# Patient Record
(demographics unavailable — no encounter records)

---

## 2025-03-11 NOTE — CONSULT LETTER
[Dear  ___] : Dear ~MECHE, [Consult Letter:] : I had the pleasure of evaluating your patient, [unfilled]. [Please see my note below.] : Please see my note below. [Consult Closing:] : Thank you very much for allowing me to participate in the care of this patient.  If you have any questions, please do not hesitate to contact me. [Sincerely,] : Sincerely, [FreeTextEntry2] : RAMOS Aldana [FreeTextEntry3] : Gigi Ritter MD, FACS System Director, Endocrine Surgery [facility] Associate  Professor of Surgery [employer] [facility]

## 2025-03-11 NOTE — HISTORY OF PRESENT ILLNESS
[de-identified] : Pt c/o Lesion under tongue and neck mass since December.  notes occasional bleeding, dry mouth and decreased tongue mobility.  history of 1 PPD tobacco use for 20 years. denies dysphagia, hoarseness, SOB or RT exposure. I have reviewed all old and new data and available images.

## 2025-03-11 NOTE — PROCEDURE
[FreeTextEntry1] : FOM biopsy [FreeTextEntry2] : FOM lesion concerning for SCCa [FreeTextEntry3] : After patient gave consent, the area of concern was anesthetized with 1% Lidocaine with 1:100K epinephrine.  A biopsy was performed and sent to pathology for further evaluation.  Hemostasis was obtained with silver nitrate.  The patient tolerated the procedure well. [Gag Reflex] : gag reflex preventing mirror examination [None] : none [Flexible Endoscope] : examined with the flexible endoscope [Serial Number: ___] : Serial Number: [unfilled] [de-identified] : After patient consents, a FFL is performed.  No lesions in the NPx, OPx, HPx or larynx.  VC are mobile, airway patent.

## 2025-03-11 NOTE — PHYSICAL EXAM
[de-identified] : no palpable thyroid nodules [Laryngoscopy Performed] : laryngoscopy was performed, see procedure section for findings [Midline] : located in midline position [de-identified] : 3.2 cm deeply infiltrative, ulcerated anterior FOM tumor with impaired tongue protrusion [Normal] : orientation to person, place, and time: normal [de-identified] : swelling left submandibular gland [de-identified] : 2.5 cm left submandibular node [de-identified] : indirect laryngoscopy shows normal vocal cord mobility bilaterally with no lesions noted

## 2025-03-11 NOTE — PHYSICAL EXAM
[Laryngoscopy Performed] : laryngoscopy was performed, see procedure section for findings [FreeTextEntry1] : Ulcerated lesion along the anterior FOM involving the ventral tongue, without involvement of the gingiva.  Tongue is mobile. [Normal] : no rashes

## 2025-03-11 NOTE — REASON FOR VISIT
[Initial Consultation] : an initial consultation for [FreeTextEntry2] : referred by Dr. Gigi Ritter for lesion under tongue and left sided neck mass

## 2025-03-11 NOTE — ASSESSMENT
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 indicated that I do not treat this condition and arranged for patient to be seen by dr bourgeois this AM. patient has been given the opportunity to ask questions, and all of the patient's questions have been answered to their satisfaction.  biopsy deferred to allow dr bourgeois to see lesion in its entirety.

## 2025-03-11 NOTE — HISTORY OF PRESENT ILLNESS
[de-identified] : 42 year old male referred by Dr. Gigi Ritter for lesion under tongue and left sided neck mass notice in December 2024.  States went to hospital 1/2025 for severe pain under his tongue when he was eating. Reports intermittent bleeding.  States mobility of tongue decreased. Reports discomfort when swallowing. Denies dysphagia, odynophagia, aspirations. Denies biopsy. Current smoker 1 pack daily for 20-30 years and marijuana use. Denies alcohol use.   CT neck 1/2025 ([facility]) Impression:1. Left submandibular space lesion most consistent with an enlarged lymph node ; recommend follow-up evaluation to confirm 2. Unremarkable appearance of the upper digestive tract. No focal abnormality of the major salivary glands. Recommend correlation with direct visualization with regard to the clinical indication of fistula

## 2025-04-01 NOTE — DATA REVIEWED
[de-identified] : PET/CT and CT Neck independently interpreted - mass along the anterior FOM, no bone erosion, avid LAD in the left neck.

## 2025-04-01 NOTE — PROCEDURE
[Gag Reflex] : gag reflex preventing mirror examination [None] : none [Flexible Endoscope] : examined with the flexible endoscope [Serial Number: ___] : Serial Number: [unfilled] [FreeTextEntry1] : FOM biopsy [FreeTextEntry2] : FOM lesion concerning for SCCa [FreeTextEntry3] : After patient gave consent, the area of concern was anesthetized with 1% Lidocaine with 1:100K epinephrine.  A biopsy was performed and sent to pathology for further evaluation.  Hemostasis was obtained with silver nitrate.  The patient tolerated the procedure well. [de-identified] : After patient consents, a FFL is performed.  No lesions in the NPx, OPx, HPx or larynx.  VC are mobile, airway patent.

## 2025-04-01 NOTE — DATA REVIEWED
[de-identified] : PET/CT and CT Neck independently interpreted - mass along the anterior FOM, no bone erosion, avid LAD in the left neck.

## 2025-04-01 NOTE — HISTORY OF PRESENT ILLNESS
[de-identified] : 42 year old male referred by Dr. Gigi Ritter for lesion under tongue and left sided neck mass notice in December 2024.  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 Ct of neck and pet ct on 3/27/2025, pt is here to discuss treatment plan.  mild rarely bleeding, mild pain. Pt c.o left submandibular gland swelling on and off worsen after meal for the past few weeks.      Current smoker 1 pack daily for 20-30 years and marijuana use. Denies alcohol use.

## 2025-04-01 NOTE — PROCEDURE
[Gag Reflex] : gag reflex preventing mirror examination [None] : none [Flexible Endoscope] : examined with the flexible endoscope [Serial Number: ___] : Serial Number: [unfilled] [FreeTextEntry1] : FOM biopsy [FreeTextEntry2] : FOM lesion concerning for SCCa [FreeTextEntry3] : After patient gave consent, the area of concern was anesthetized with 1% Lidocaine with 1:100K epinephrine.  A biopsy was performed and sent to pathology for further evaluation.  Hemostasis was obtained with silver nitrate.  The patient tolerated the procedure well. [de-identified] : After patient consents, a FFL is performed.  No lesions in the NPx, OPx, HPx or larynx.  VC are mobile, airway patent.

## 2025-04-01 NOTE — HISTORY OF PRESENT ILLNESS
[de-identified] : 42 year old male referred by Dr. Gigi Ritter for lesion under tongue and left sided neck mass notice in December 2024.  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 Ct of neck and pet ct on 3/27/2025, pt is here to discuss treatment plan.  mild rarely bleeding, mild pain. Pt c.o left submandibular gland swelling on and off worsen after meal for the past few weeks.      Current smoker 1 pack daily for 20-30 years and marijuana use. Denies alcohol use.

## 2025-04-01 NOTE — PHYSICAL EXAM
[Normal] : no rashes [Laryngoscopy Performed] : laryngoscopy was performed, see procedure section for findings [FreeTextEntry1] : Ulcerated lesion along the anterior FOM involving the ventral tongue, without involvement of the gingiva.  Tongue is mobile.

## 2025-04-09 NOTE — PHYSICAL EXAM
[de-identified] : Ulcerated lesion along the anterior FOM involving the ventral tongue, without involvement of the gingiva. Tongue is mobile. [de-identified] : Edson test positive

## 2025-04-09 NOTE — HISTORY OF PRESENT ILLNESS
[FreeTextEntry1] :  Pt is a 42 year old male referred by Dr. Spencer for lesion under tongue and left sided neck mass notice in December 2024. States went to hospital 1/2025 for severe pain under his tongue when he was eating. Reports intermittent bleeding. States mobility of tongue decreased. Reports discomfort when swallowing. Denies dysphagia, odynophagia, aspirations. Denies biopsy. Current smoker 1 pack daily for 20-30 years and marijuana use. Denies alcohol use.  CT neck 1/2025 ([facility]) Impression:1. Left submandibular space lesion most consistent with an enlarged lymph node ; recommend follow-up evaluation to confirm 2. Unremarkable appearance of the upper digestive tract. No focal abnormality of the major salivary glands. Recommend correlation with direct visualization with regard to the clinical indication of fistula

## 2025-05-08 NOTE — DATA REVIEWED
[de-identified] : PET/CT and CT Neck independently interpreted - mass along the anterior FOM, no bone erosion, avid LAD in the left neck.

## 2025-05-08 NOTE — DATA REVIEWED
[de-identified] : PET/CT and CT Neck independently interpreted - mass along the anterior FOM, no bone erosion, avid LAD in the left neck.

## 2025-05-13 NOTE — HISTORY OF PRESENT ILLNESS
[de-identified] : 42 year old male referred by Dr. 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 Ct of neck and pet ct on 3/27/2025, PT is post s/p marginal mandibulectomy, bilateral neck dissection 1-3, tracheostomy placement, left radial forearm free flap, skin graft 4/25/25, and path Squamous cell carcinoma, well to moderately differentiated,Perineural invasion and lymphovascular invasion seen , Left bone margin focally positive for squamous cell carcinoma and 1 lymph node +. pt is here to f/u and doing ok, some pain neck area and some swelling at the chin,  trach site healing and left arm healing well with sutures intact.  pt is on soft diet and puree food.  Current smoker 1 pack daily for 20-30 years and marijuana use. Denies alcohol use.

## 2025-05-13 NOTE — PHYSICAL EXAM
[Normal] : no rashes [Laryngoscopy Performed] : laryngoscopy was performed, see procedure section for findings [de-identified] : Incision healing well, no LAD.  Trach site is healing well. [FreeTextEntry1] : Flap is healing well, no dehiscence, no new lesions.

## 2025-05-13 NOTE — PROCEDURE
[Gag Reflex] : gag reflex preventing mirror examination [None] : none [Flexible Endoscope] : examined with the flexible endoscope [Serial Number: ___] : Serial Number: [unfilled] [de-identified] : After patient consents, a FFL is performed.  No lesions in the NPx, OPx, HPx or larynx.  VC are mobile, airway patent.

## 2025-05-13 NOTE — HISTORY OF PRESENT ILLNESS
[de-identified] : 42 year old male referred by Dr. 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 Ct of neck and pet ct on 3/27/2025, PT is post s/p marginal mandibulectomy, bilateral neck dissection 1-3, tracheostomy placement, left radial forearm free flap, skin graft 4/25/25, and path Squamous cell carcinoma, well to moderately differentiated,Perineural invasion and lymphovascular invasion seen , Left bone margin focally positive for squamous cell carcinoma and 1 lymph node +. pt is here to f/u and doing ok, some pain neck area and some swelling at the chin,  trach site healing and left arm healing well with sutures intact.  pt is on soft diet and puree food.  Current smoker 1 pack daily for 20-30 years and marijuana use. Denies alcohol use.

## 2025-05-13 NOTE — PHYSICAL EXAM
[Normal] : no rashes [Laryngoscopy Performed] : laryngoscopy was performed, see procedure section for findings [de-identified] : Incision healing well, no LAD.  Trach site is healing well. [FreeTextEntry1] : Flap is healing well, no dehiscence, no new lesions.

## 2025-05-13 NOTE — PROCEDURE
[Gag Reflex] : gag reflex preventing mirror examination [None] : none [Flexible Endoscope] : examined with the flexible endoscope [Serial Number: ___] : Serial Number: [unfilled] [de-identified] : After patient consents, a FFL is performed.  No lesions in the NPx, OPx, HPx or larynx.  VC are mobile, airway patent.

## 2025-05-21 NOTE — PHYSICAL EXAM
[General Appearance - Alert] : alert [General Appearance - In No Acute Distress] : in no acute distress [Respiration, Rhythm And Depth] : normal respiratory rhythm and effort [Exaggerated Use Of Accessory Muscles For Inspiration] : no accessory muscle use [Heart Rate And Rhythm] : heart rate and rhythm were normal [Arterial Pulses Normal] : the arterial pulses were normal [Abdomen Soft] : soft [Nondistended] : nondistended [Musculoskeletal - Swelling] : no joint swelling [Nail Clubbing] : no clubbing  or cyanosis of the fingernails [Skin Color & Pigmentation] : normal skin color and pigmentation [No Focal Deficits] : no focal deficits [Oriented To Time, Place, And Person] : oriented to person, place, and time [Sclera] : the sclera and conjunctiva were normal [Extraocular Movements] : extraocular movements were intact [Normal] : normal spine exam without palpable tenderness, no kyphosis or scoliosis [] : no rash [Sensation] : the sensory exam was normal to light touch and pinprick [Affect] : the affect was normal [de-identified] : Well healed neck incision, C/D/I; flap well-vascularlized and healing well; no suspicoius lesions, mild trismus [de-identified] : submental edema

## 2025-05-21 NOTE — HISTORY OF PRESENT ILLNESS
[FreeTextEntry1] :  Mr. Hawkins is a 42 year old male approx. 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   3/27/2025 CT neck Compared to the prior CT from January 26, 2025, enhancing lesion seen in the floor of mouth appears relatively stable in size. MRI would allow for better delineation of the extent of this lesion and depth of invasion into the tongue. Increase in size and complexity of left level 1B metastatic lymph node. New or increasing abnormality of the left level IIb lymph node, which now appears metastatic, corresponding to findings in the same day PET/CT.  3/27/2025 PET/CT Intense hypermetabolism involving the tip of the tongue with extension to the anterior left tongue consistent with known squamous cell carcinoma. Level 1B and level 2A lymph nodes are intensely hypermetabolic consistent with metastasis. Otherwise no abnormal focal FDG activity elsewhere.  PT is post s/p marginal mandibulectomy, bilateral neck dissection 1-3, tracheostomy placement, left radial forearm free flap, skin graft 4/25/25, and path Squamous cell carcinoma, well to moderately differentiated, Perineural invasion and lymphovascular invasion seen , Left bone margin focally positive for squamous cell carcinoma and 1 lymph node +.  Grade 1 G2, tumor size 3.2 cm.  Depth of invasion 16 mm.  There was bilateral involvement of neck nodes, largest size 4.7 cm with SUHA  5/21/2025 he presents today accompanied by spouse. he reports recovering well postoperatively, on soft diets. Denies any pain or difficulty with swallowing. No Dysgeusia, there is some residual neck swelling that is improving post operatively. he quit smoking about 1 month ago and quit alcohol  7 years ago

## 2025-05-21 NOTE — END OF VISIT
[] : Resident [FreeTextEntry3] : We had an extensive discussion of the patient's imaging, labs, and pathology, and reviewed them together; I independently evaluated these and discussed their significance with the patient and family. I have also been involved in the multidisciplinary discussion of his care with his other providers. We discussed the natural history of oral cavity cancer and its management. [Time Spent: ___ minutes] : I have spent [unfilled] minutes of time on the encounter which excludes teaching and separately reported services.

## 2025-05-21 NOTE — REASON FOR VISIT
[Consideration of Curative Therapy] : consideration of curative therapy for [Breast Cancer] : breast cancer [Head and Neck Cancer] : head and neck cancer

## 2025-05-21 NOTE — HISTORY OF PRESENT ILLNESS
[FreeTextEntry1] :  Mr. Hawkins is a 42 year old male approx. 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   3/27/2025 CT neck Compared to the prior CT from January 26, 2025, enhancing lesion seen in the floor of mouth appears relatively stable in size. MRI would allow for better delineation of the extent of this lesion and depth of invasion into the tongue. Increase in size and complexity of left level 1B metastatic lymph node. New or increasing abnormality of the left level IIb lymph node, which now appears metastatic, corresponding to findings in the same day PET/CT.  3/27/2025 PET/CT Intense hypermetabolism involving the tip of the tongue with extension to the anterior left tongue consistent with known squamous cell carcinoma. Level 1B and level 2A lymph nodes are intensely hypermetabolic consistent with metastasis. Otherwise no abnormal focal FDG activity elsewhere.  PT is post s/p marginal mandibulectomy, bilateral neck dissection 1-3, tracheostomy placement, left radial forearm free flap, skin graft 4/25/25, and path Squamous cell carcinoma, well to moderately differentiated, Perineural invasion and lymphovascular invasion seen , Left bone margin focally positive for squamous cell carcinoma and 1 lymph node +.  Grade 1 G2, tumor size 3.2 cm.  Depth of invasion 16 mm.  There was bilateral involvement of neck nodes, largest size 4.7 cm with SUHA  5/21/2025 he presents today accompanied by spouse. he reports recovering well postoperatively, on soft diets. Denies any pain or difficulty with swallowing. No Dysgeusia, there is some residual neck swelling that is improving post operatively. he quit smoking about 1 month ago and quit alcohol  7 years ago

## 2025-05-21 NOTE — PHYSICAL EXAM
[General Appearance - Alert] : alert [General Appearance - In No Acute Distress] : in no acute distress [Respiration, Rhythm And Depth] : normal respiratory rhythm and effort [Exaggerated Use Of Accessory Muscles For Inspiration] : no accessory muscle use [Heart Rate And Rhythm] : heart rate and rhythm were normal [Arterial Pulses Normal] : the arterial pulses were normal [Abdomen Soft] : soft [Nondistended] : nondistended [Musculoskeletal - Swelling] : no joint swelling [Nail Clubbing] : no clubbing  or cyanosis of the fingernails [Skin Color & Pigmentation] : normal skin color and pigmentation [No Focal Deficits] : no focal deficits [Oriented To Time, Place, And Person] : oriented to person, place, and time [Sclera] : the sclera and conjunctiva were normal [Extraocular Movements] : extraocular movements were intact [Normal] : normal spine exam without palpable tenderness, no kyphosis or scoliosis [] : no rash [Sensation] : the sensory exam was normal to light touch and pinprick [Affect] : the affect was normal [de-identified] : Well healed neck incision, C/D/I; flap well-vascularlized and healing well; no suspicoius lesions, mild trismus [de-identified] : submental edema

## 2025-05-21 NOTE — REVIEW OF SYSTEMS
[Negative] : Endocrine [Edema Limbs: Grade 0] : Edema Limbs: Grade 0  [Fatigue: Grade 0] : Fatigue: Grade 0 [Localized Edema: Grade 0] : Localized Edema: Grade 0  [Neck Edema: Grade 0] : Neck Edema: Grade 0 [Tinnitus - Grade 0] : Tinnitus - Grade 0 [Blurred Vision: Grade 0] : Blurred Vision: Grade 0 [Mucositis Oral: Grade 0] : Mucositis Oral: Grade 0  [Xerostomia: Grade 0] : Xerostomia: Grade 0 [Oral Pain: Grade 0] : Oral Pain: Grade 0 [Salivary duct inflammation: Grade 0] : Salivary duct inflammation: Grade 0 [Dysgeusia: Grade 0] : Dysgeusia: Grade 0 [Dysphagia] : no dysphagia [Loss of Hearing] : no loss of hearing [Nosebleeds] : no nosebleeds [Hoarseness] : no hoarseness [Odynophagia] : no odynophagia [Mucosal Pain] : no mucosal pain

## 2025-05-21 NOTE — DISEASE MANAGEMENT
[Clinical] : TNM Stage: c [Pathological] : TNM Stage: p [IVB] : IVB [TTNM] : 4a [NTNM] : 3b [MTNM] : 0

## 2025-05-27 NOTE — PHYSICAL EXAM
[Restricted in physically strenuous activity but ambulatory and able to carry out work of a light or sedentary nature] : Status 1- Restricted in physically strenuous activity but ambulatory and able to carry out work of a light or sedentary nature, e.g., light house work, office work [Normal] : affect appropriate [de-identified] : mild speech impedipent

## 2025-05-27 NOTE — HISTORY OF PRESENT ILLNESS
[Disease: _____________________] : Disease: [unfilled] [T: ___] : T[unfilled] [N: ___] : N[unfilled] [M: ___] : M[unfilled] [AJCC Stage: ____] : AJCC Stage: [unfilled] [de-identified] : A 42-year-old male with no comorbid conditions initially experienced discomfort while eating, which began a year ago and progressively worsened into severe pain. He noticed a hole-like lesion on the floor of his mouth and consulted his primary care physician, who referred him to Dr. Ritter. Following further evaluation by Dr. Spencer, he underwent a biopsy of the anterior floor of the mouth on 3/11/2025, confirming a diagnosis of well to moderately differentiated squamous cell carcinoma.  Imaging studies, including a CT scan of the neck and a PET/CT on 3/27/2025, revealed:  Intense hypermetabolism at the tip of the tongue extending to the anterior left tongue, consistent with known squamous cell carcinoma.  Intensely hypermetabolic lymph nodes in Levels 1B and 2A, indicative of metastatic disease.  No abnormal focal FDG activity elsewhere.  The patient underwent extensive surgical treatment on 4/25/2025, including marginal mandibulectomy, bilateral neck dissection (Levels 1-3), tracheostomy placement, left radial forearm free flap reconstruction, and skin grafting. Pathology confirmed squamous cell carcinoma, well to moderately differentiated, with perineural and lymphovascular invasion. The left bone margin was focally positive for squamous cell carcinoma, and 1 of 4 Level 1 lymph nodes was positive for metastatic carcinoma with extranodal extension.  At follow-up, the patient is doing well overall but reports some pain in the neck area and swelling at the chin. The tracheostomy site and left forearm wound are healing appropriately, with sutures intact. He is currently on a soft diet and consuming pureed food.  He has a history of heavy tobacco use, smoking one pack per day for 20-30 years, but quit six weeks ago. He previously used marijuana and denies alcohol consumption. He has consulted with Dr. Arredondo and is scheduled to begin radiation therapy next month, pending dental clearance.  [de-identified] : sq cell ca

## 2025-05-27 NOTE — REVIEW OF SYSTEMS
[Fatigue] : fatigue [Recent Change In Weight] : ~T recent weight change [Dysphagia] : dysphagia [Odynophagia] : odynophagia [Mucosal Pain] : mucosal pain [Diarrhea: Grade 0] : Diarrhea: Grade 0 [Negative] : Allergic/Immunologic [FreeTextEntry4] : as above

## 2025-05-27 NOTE — ASSESSMENT
[FreeTextEntry1] : Mr. Hawkins is a pleasant 43 yo m with stage IVB oral squamous cell carcinoma, has a history of heavy smoking but quit six weeks ago. Following surgery, pathology revealed a positive bone margin, perineural invasion (PNI), lymphovascular invasion (LVI), and a positive lymph node with extranodal extension (SUHA). He is scheduled to begin radiation therapy (RT) pending dental evaluation Based on multiple risk factors including SUHA, adjuvant chemo is recommended along with RT Pt has seen Dr. Arredondo wo has recommended RT for 6 weeks We discussed the rationale for chemotherapy-cisplatin 40mg/m2 weekly, with each session lasting 3-5 hours in the chair, accompanied by aggressive IV hydration. Additionally, he will have an extra day of IV fluids for 2 hours to support hydration and reduce treatment-related toxicity.  We discussed general precautions while undergoing chemotherapy, including: - Infection prevention (hand hygiene, avoiding sick contacts, monitoring for fever) - Hydration and electrolyte balance (maintaining adequate fluid intake) - Managing nausea and gastrointestinal side effects (adhering to prescribed antiemetics) - Fatigue management (balancing rest and activity) - Oral care (regular dental hygiene to prevent mucositis) - Skin protection (avoiding excessive sun exposure) - Monitoring for neuropathy (notifying providers of tingling or numbness)  Diet and nutrition recommendations were reviewed, emphasizing a high-protein, nutrient-dense diet to support recovery and minimize treatment-related side effects. We also reiterated the importance of abstinence from tobacco and other harmful substances.  Additionally, we reviewed the major adverse effects (AEs) of **cisplatin-induced renal toxicity**, which include: - Acute kidney injury (CARIDAD) (decline in renal function due to tubular damage) - Electrolyte imbalances (hypomagnesemia, hypokalemia, hypocalcemia) - Dehydration risk (importance of IV hydration and adequate fluid intake) - nephropathy  - cisplatin-induced ototoxicity manifested as High-frequency sensorineural hearing loss (often irreversible); Tinnitus (ringing in the ears); Difficulty understanding speech (especially in noisy environments); Potential progression to profound hearing loss (necessitating audiologic monitoring)  Should renal or hearing complications arise before or during treatment, we will transition to carboplatin as an alternative. Prognosis and curative intent have been discussed.

## 2025-05-27 NOTE — REASON FOR VISIT
[Home] : at home, [unfilled] , at the time of the visit. [Medical Office: ([facility])___] : at the medical office located in  [Telehealth (audio & video)] : This visit was provided via telehealth using real-time 2-way audio visual technology. [Verbal consent obtained from patient] : the patient, [unfilled] [Initial Consultation] : an initial consultation [FreeTextEntry2] : oral sq cell ca

## 2025-06-24 NOTE — PROCEDURE
[Gag Reflex] : gag reflex preventing mirror examination [None] : none [Flexible Endoscope] : examined with the flexible endoscope [Serial Number: ___] : Serial Number: [unfilled] [FreeTextEntry1] : Cauterization of exuberant granulation tissue at trach site [FreeTextEntry2] : Exuberant granulation tissue at trach site [FreeTextEntry3] : After patient consented, the area of granulation was cauterized with silver nitrate.  Patient tolerated the procedure well.  [de-identified] : After patient consents, a FFL is performed.  No lesions in the NPx, OPx, HPx or larynx.  VC are mobile, airway patent.

## 2025-06-24 NOTE — HISTORY OF PRESENT ILLNESS
[de-identified] : 42 year old male referred by Dr. 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 Ct of neck and pet ct on 3/27/2025, PT is post s/p marginal mandibulectomy, bilateral neck dissection 1-3, tracheostomy placement, left radial forearm free flap, skin graft 4/25/25, and path Squamous cell carcinoma, well to moderately differentiated, Perineural invasion and lymphovascular invasion seen, Left bone margin focally positive for squamous cell carcinoma and 1 lymph node +. Pt is being follow by Dr. Feliz and Dr. Arredondo , planning RT 7/1/2025, post dental clearance and waiting of the exraction to heal.  pt is here to f/u and doing ok, some pain neck area and swelling improved, except some tooth pain from extraction. trach site healing well with mild discharge, no infection and left arm healing pt is on regular food, no wt loss.  Current smoker 1 pack daily for 20-30 years and marijuana use. Denies alcohol use.

## 2025-06-24 NOTE — PHYSICAL EXAM
[Normal] : no rashes [Laryngoscopy Performed] : laryngoscopy was performed, see procedure section for findings [de-identified] : Incision healing well, no LAD.  Trach site is healing well but with hypertrophic granulation. [FreeTextEntry1] : Flap is healing well, no dehiscence, no new lesions.

## 2025-07-15 NOTE — VITALS
[Maximal Pain Intensity: 10/10] : 10/10 [Least Pain Intensity: 6/10] : 6/10 [OTC] : OTC [Opioid] : opioid [90: Able to carry normal activity; minor signs or symptoms of disease.] : 90: Able to carry normal activity; minor signs or symptoms of disease.

## 2025-07-16 NOTE — HISTORY OF PRESENT ILLNESS
[FreeTextEntry1] : Mr. 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 Pathology yielded SCC, well-moderately differentiated, 3.2cm, 1.6cm DOI, +intratumoral PNI (0.5mm nerve diameter), present at bone margin, +LVSI, 6/83 lymph nodes with largest measuring 4.7cm and +SUHA (left level I with SUHA).   7/15/;2025 He presents for on treatment visit. Completed 5/33 fractions He has c/o right sided jaw/mouth pain when laying down on right side. Currently taking oxycodone 5 m g and Tylenol 1 g at night which is effective. Will add Gabapentin 600 mg at HS Pain referral initiated  He reports mechanical problems with chewing, instructed to follow SLP

## 2025-07-18 NOTE — HISTORY OF PRESENT ILLNESS
[Disease: _____________________] : Disease: [unfilled] [T: ___] : T[unfilled] [N: ___] : N[unfilled] [M: ___] : M[unfilled] [AJCC Stage: ____] : AJCC Stage: [unfilled] [de-identified] : A 42-year-old male with no comorbid conditions initially experienced discomfort while eating, which began a year ago and progressively worsened into severe pain. He noticed a hole-like lesion on the floor of his mouth and consulted his primary care physician, who referred him to Dr. Ritter. Following further evaluation by Dr. Spencer, he underwent a biopsy of the anterior floor of the mouth on 3/11/2025, confirming a diagnosis of well to moderately differentiated squamous cell carcinoma.  Imaging studies, including a CT scan of the neck and a PET/CT on 3/27/2025, revealed:  Intense hypermetabolism at the tip of the tongue extending to the anterior left tongue, consistent with known squamous cell carcinoma.  Intensely hypermetabolic lymph nodes in Levels 1B and 2A, indicative of metastatic disease.  No abnormal focal FDG activity elsewhere.  The patient underwent extensive surgical treatment on 4/25/2025, including marginal mandibulectomy, bilateral neck dissection (Levels 1-3), tracheostomy placement, left radial forearm free flap reconstruction, and skin grafting. Pathology confirmed squamous cell carcinoma, well to moderately differentiated, with perineural and lymphovascular invasion. The left bone margin was focally positive for squamous cell carcinoma, and 1 of 4 Level 1 lymph nodes was positive for metastatic carcinoma with extranodal extension.  At follow-up, the patient is doing well overall but reports some pain in the neck area and swelling at the chin. The tracheostomy site and left forearm wound are healing appropriately, with sutures intact. He is currently on a soft diet and consuming pureed food.  He has a history of heavy tobacco use, smoking one pack per day for 20-30 years, but quit six weeks ago. He previously used marijuana and denies alcohol consumption. He has consulted with Dr. Arredondo and is scheduled to begin radiation therapy next month, pending dental clearance.  [de-identified] : sq cell ca [de-identified] : 7/18/25: Since initial visit pt began treatment for Oral Squamous cell carcinoma with CCRT. He is scheduled for W2 low dose cis and has completed 8/33 fx XRT to date. He has been started on Oxycodone and Gabapentin for pain with good results. No new complaints.

## 2025-07-18 NOTE — ASSESSMENT
[FreeTextEntry1] : Mr. Hawkins is a pleasant 43 yo m with stage IVB oral squamous cell carcinoma, has a history of heavy smoking but quit six weeks ago. Following surgery, pathology revealed a positive bone margin, perineural invasion (PNI), lymphovascular invasion (LVI), and a positive lymph node with extranodal extension (SUHA). He is scheduled to begin radiation therapy (RT) pending dental evaluation Based on multiple risk factors including SUAH, adjuvant chemo is recommended along with RT Pt has seen Dr. Arredondo wo has recommended RT for 6 weeks We discussed the rationale for chemotherapy-cisplatin 40mg/m2 weekly, with each session lasting 3-5 hours in the chair, accompanied by aggressive IV hydration. Additionally, he will have an extra day of IV fluids for 2 hours to support hydration and reduce treatment-related toxicity.  We discussed general precautions while undergoing chemotherapy, including: - Infection prevention (hand hygiene, avoiding sick contacts, monitoring for fever) - Hydration and electrolyte balance (maintaining adequate fluid intake) - Managing nausea and gastrointestinal side effects (adhering to prescribed antiemetics) - Fatigue management (balancing rest and activity) - Oral care (regular dental hygiene to prevent mucositis) - Skin protection (avoiding excessive sun exposure) - Monitoring for neuropathy (notifying providers of tingling or numbness)  Diet and nutrition recommendations were reviewed, emphasizing a high-protein, nutrient-dense diet to support recovery and minimize treatment-related side effects. We also reiterated the importance of abstinence from tobacco and other harmful substances.  Additionally, we reviewed the major adverse effects (AEs) of **cisplatin-induced renal toxicity**, which include: - Acute kidney injury (CARIDAD) (decline in renal function due to tubular damage) - Electrolyte imbalances (hypomagnesemia, hypokalemia, hypocalcemia) - Dehydration risk (importance of IV hydration and adequate fluid intake) - nephropathy  - cisplatin-induced ototoxicity manifested as High-frequency sensorineural hearing loss (often irreversible); Tinnitus (ringing in the ears); Difficulty understanding speech (especially in noisy environments); Potential progression to profound hearing loss (necessitating audiologic monitoring)  Should renal or hearing complications arise before or during treatment, we will transition to carboplatin as an alternative. Prognosis and curative intent have been discussed.  #Stage IVB oral SSC  -COntinue CCRT with weekly cis -Supportive symptom management -Continue Oxycodone/Gabapentin for pain  -Monitor for treatment related toxicity as previously outlined -F/U BW sent today -Weekly OV

## 2025-07-18 NOTE — DISEASE MANAGEMENT
[Pathological] : TNM Stage: p [TTNM] : 4a [NTNM] : 3b [MTNM] : 0 [IVB] : IVB [de-identified] : 2000 [de-identified] : [id number] [de-identified] : FOM/Necks

## 2025-07-18 NOTE — PHYSICAL EXAM
[Restricted in physically strenuous activity but ambulatory and able to carry out work of a light or sedentary nature] : Status 1- Restricted in physically strenuous activity but ambulatory and able to carry out work of a light or sedentary nature, e.g., light house work, office work [Normal] : affect appropriate [de-identified] : mild speech impedipent

## 2025-07-18 NOTE — HISTORY OF PRESENT ILLNESS
[FreeTextEntry1] : Mr. Hawkins is a 42 year old male 20 pack year smoking history (quit 1 month ago) and former heavy drinker (sober 7 years) with Stage IVB [id number] SCC of the left anterior FOM s/p composite resection of FOM with marginal mandibulectomy and partial glossectomy and bilateral neck dissection (1-4 left, 1-3 right) and left RFFF. Pathology yielded SCC, well-moderately differentiated, 3.2cm, 1.6cm DOI, +intratumoral PNI (0.5mm nerve diameter), present at bone margin, +LVSI, 6/83 lymph nodes with largest measuring 4.7cm and +SUHA (left level I with SUHA).   7/15/;2025 He presents for on treatment visit. Completed 5/33 fractions He has c/o right sided jaw/mouth pain when laying down on right side. Currently taking oxycodone 5 m g and Tylenol 1 g at night which is effective. Will add Gabapentin 600 mg at HS Pain referral initiated  He reports mechanical problems with chewing, instructed to follow SLP  7/22/2025 He presents for on treatment visit. Completed 10/33 fractions

## 2025-07-24 NOTE — DISEASE MANAGEMENT
[TTNM] : 4a [NTNM] : 3b [MTNM] : 0 [de-identified] : 1171 [de-identified] : [id number] [de-identified] : FOM/Necks

## 2025-07-24 NOTE — HISTORY OF PRESENT ILLNESS
[FreeTextEntry1] : Mr. Hawkins is a 42 year old male 20 pack year smoking history (quit 1 month ago) and former heavy drinker (sober 7 years) with Stage IVB [id number] SCC of the left anterior FOM s/p composite resection of FOM with marginal mandibulectomy and partial glossectomy and bilateral neck dissection (1-4 left, 1-3 right) and left RFFF. Pathology yielded SCC, well-moderately differentiated, 3.2cm, 1.6cm DOI, +intratumoral PNI (0.5mm nerve diameter), present at bone margin, +LVSI, 6/83 lymph nodes with largest measuring 4.7cm and +SUHA (left level I with SUHA).   7/15/;2025 He presents for on treatment visit. Completed 5/33 fractions He has c/o right sided jaw/mouth pain when laying down on right side. Currently taking oxycodone 5 m g and Tylenol 1 g at night which is effective. Will add Gabapentin 600 mg at HS Pain referral initiated  He reports mechanical problems with chewing, instructed to follow SLP  7/23/2025 He presents for on treatment visit. Completed 11/33 fractions

## 2025-07-24 NOTE — DISEASE MANAGEMENT
[TTNM] : 4a [NTNM] : 3b [MTNM] : 0 [de-identified] : [id number] [de-identified] : [id number] [de-identified] : FOM/Necks

## 2025-07-24 NOTE — HISTORY OF PRESENT ILLNESS
[FreeTextEntry1] : Mr. Hawkins is a 42 year old male 20 pack year smoking history (quit 1 month ago) and former heavy drinker (sober 7 years) with Stage IVB [id number] SCC of the left anterior FOM s/p composite resection of FOM with marginal mandibulectomy and partial glossectomy and bilateral neck dissection (1-4 left, 1-3 right) and left RFFF. Pathology yielded SCC, well-moderately differentiated, 3.2cm, 1.6cm DOI, +intratumoral PNI (0.5mm nerve diameter), present at bone margin, +LVSI, 6/83 lymph nodes with largest measuring 4.7cm and +SUHA (left level I with SUHA).   7/15/;2025 He presents for on treatment visit. Completed 5/33 fractions He has c/o right sided jaw/mouth pain when laying down on right side. Currently taking oxycodone 5 m g and Tylenol 1 g at night which is effective. Will add Gabapentin 600 mg at HS Pain referral initiated  He reports mechanical problems with chewing, instructed to follow SLP  7/23/2025 He presents for on treatment visit. Completed 11/33 fractions

## 2025-07-25 NOTE — PHYSICAL EXAM
[Restricted in physically strenuous activity but ambulatory and able to carry out work of a light or sedentary nature] : Status 1- Restricted in physically strenuous activity but ambulatory and able to carry out work of a light or sedentary nature, e.g., light house work, office work [Normal] : affect appropriate [de-identified] : mild speech impedipent

## 2025-07-25 NOTE — ASSESSMENT
[FreeTextEntry1] : Mr. Hawkins is a pleasant 43 yo m with stage IVB oral squamous cell carcinoma, has a history of heavy smoking but quit six weeks ago. Following surgery, pathology revealed a positive bone margin, perineural invasion (PNI), lymphovascular invasion (LVI), and a positive lymph node with extranodal extension (SUHA). He is scheduled to begin radiation therapy (RT) pending dental evaluation Based on multiple risk factors including SUHA, adjuvant chemo is recommended along with RT Pt has seen Dr. Arredondo wo has recommended RT for 6 weeks We discussed the rationale for chemotherapy-cisplatin 40mg/m2 weekly, with each session lasting 3-5 hours in the chair, accompanied by aggressive IV hydration. Additionally, he will have an extra day of IV fluids for 2 hours to support hydration and reduce treatment-related toxicity.  We discussed general precautions while undergoing chemotherapy, including: - Infection prevention (hand hygiene, avoiding sick contacts, monitoring for fever) - Hydration and electrolyte balance (maintaining adequate fluid intake) - Managing nausea and gastrointestinal side effects (adhering to prescribed antiemetics) - Fatigue management (balancing rest and activity) - Oral care (regular dental hygiene to prevent mucositis) - Skin protection (avoiding excessive sun exposure) - Monitoring for neuropathy (notifying providers of tingling or numbness)  Diet and nutrition recommendations were reviewed, emphasizing a high-protein, nutrient-dense diet to support recovery and minimize treatment-related side effects. We also reiterated the importance of abstinence from tobacco and other harmful substances.  Additionally, we reviewed the major adverse effects (AEs) of **cisplatin-induced renal toxicity**, which include: - Acute kidney injury (CARIDAD) (decline in renal function due to tubular damage) - Electrolyte imbalances (hypomagnesemia, hypokalemia, hypocalcemia) - Dehydration risk (importance of IV hydration and adequate fluid intake) - nephropathy  - cisplatin-induced ototoxicity manifested as High-frequency sensorineural hearing loss (often irreversible); Tinnitus (ringing in the ears); Difficulty understanding speech (especially in noisy environments); Potential progression to profound hearing loss (necessitating audiologic monitoring)  Should renal or hearing complications arise before or during treatment, we will transition to carboplatin as an alternative. Prognosis and curative intent have been discussed.  #Stage IVB oral SSC  -COntinue CCRT with weekly cis. Currently W3 and completed 13/33fx RT -Supportive symptom management -Continue Oxycodone 5mg prn for pain  -Monitor for treatment related toxicity as previously outlined - CBC Reviewed and is stable. F/U remaining BW sent today -Weekly OV

## 2025-07-25 NOTE — HISTORY OF PRESENT ILLNESS
[Disease: _____________________] : Disease: [unfilled] [T: ___] : T[unfilled] [N: ___] : N[unfilled] [M: ___] : M[unfilled] [AJCC Stage: ____] : AJCC Stage: [unfilled] [de-identified] : A 42-year-old male with no comorbid conditions initially experienced discomfort while eating, which began a year ago and progressively worsened into severe pain. He noticed a hole-like lesion on the floor of his mouth and consulted his primary care physician, who referred him to Dr. Ritter. Following further evaluation by Dr. Spencer, he underwent a biopsy of the anterior floor of the mouth on 3/11/2025, confirming a diagnosis of well to moderately differentiated squamous cell carcinoma.  Imaging studies, including a CT scan of the neck and a PET/CT on 3/27/2025, revealed:  Intense hypermetabolism at the tip of the tongue extending to the anterior left tongue, consistent with known squamous cell carcinoma.  Intensely hypermetabolic lymph nodes in Levels 1B and 2A, indicative of metastatic disease.  No abnormal focal FDG activity elsewhere.  The patient underwent extensive surgical treatment on 4/25/2025, including marginal mandibulectomy, bilateral neck dissection (Levels 1-3), tracheostomy placement, left radial forearm free flap reconstruction, and skin grafting. Pathology confirmed squamous cell carcinoma, well to moderately differentiated, with perineural and lymphovascular invasion. The left bone margin was focally positive for squamous cell carcinoma, and 1 of 4 Level 1 lymph nodes was positive for metastatic carcinoma with extranodal extension.  At follow-up, the patient is doing well overall but reports some pain in the neck area and swelling at the chin. The tracheostomy site and left forearm wound are healing appropriately, with sutures intact. He is currently on a soft diet and consuming pureed food.  He has a history of heavy tobacco use, smoking one pack per day for 20-30 years, but quit six weeks ago. He previously used marijuana and denies alcohol consumption. He has consulted with Dr. Arredondo and is scheduled to begin radiation therapy next month, pending dental clearance.  [de-identified] : sq cell ca [de-identified] : 7/18/25: Since initial visit pt began treatment for Oral Squamous cell carcinoma with CCRT. He is scheduled for W2 low dose cis and has completed 8/33 fx XRT to date. He has been started on Oxycodone and Gabapentin for pain with good results. No new complaints.   7/25/25: Pt presents for W3 CCRT with low dose cis and has completed 13/33 fx XRT to date. Tolerating treatment well. Pain improved. Taking oxy 5mg prn. Mild Fatigue otherwise no new complaints

## 2025-07-29 NOTE — REVIEW OF SYSTEMS
[Dysgeusia: Grade 1- Altered taste but no change in diet] : Dysgeusia: Grade 1 - Altered taste but no change in diet [Dermatitis Radiation: Grade 1 - Faint erythema or dry desquamation] : Dermatitis Radiation: Grade 1 - Faint erythema or dry desquamation [Nausea: Grade 1 - Loss of appetite without alteration in eating habits] : Nausea: Grade 1 - Loss of appetite without alteration in eating habits

## 2025-07-30 NOTE — HISTORY OF PRESENT ILLNESS
[FreeTextEntry1] : Mr. Hawkins is a 42 year old male 20 pack year smoking history (quit 1 month ago) and former heavy drinker (sober 7 years) with Stage IVB [id number] SCC of the left anterior FOM s/p composite resection of FOM with marginal mandibulectomy and partial glossectomy and bilateral neck dissection (1-4 left, 1-3 right) and left RFFF. Pathology yielded SCC, well-moderately differentiated, 3.2cm, 1.6cm DOI, +intratumoral PNI (0.5mm nerve diameter), present at bone margin, +LVSI, 6/83 lymph nodes with largest measuring 4.7cm and +SUHA (left level I with SUHA).   7/15/;2025 He presents for on treatment visit. Completed 5/33 fractions He has c/o right sided jaw/mouth pain when laying down on right side. Currently taking oxycodone 5 m g and Tylenol 1 g at night which is effective. Will add Gabapentin 600 mg at HS Pain referral initiated  He reports mechanical problems with chewing, instructed to follow SLP  7/23/2025 He presents for on treatment visit. Completed 11/33 fractions  7/29/2025 He presents for on treatment visit. Completed 15/33 fractions Reports feeling nausea and regurgitation after meals, restarted Reglan. Ongoing taste changes, encouraged to eat for nutrition and try various food choices Right oral pain is resolved, no longer needing pain medication Reinforced baking soda oral rinses Continues weekly low dose Cisplatin

## 2025-07-30 NOTE — DISEASE MANAGEMENT
[Pathological] : TNM Stage: p [IVB] : IVB [TTNM] : 4a [NTNM] : 3b [MTNM] : 0 [de-identified] : 1171 [de-identified] : [id number] [de-identified] : FOM/Necks

## 2025-07-30 NOTE — DISEASE MANAGEMENT
[Pathological] : TNM Stage: p [IVB] : IVB [TTNM] : 4a [NTNM] : 3b [MTNM] : 0 [de-identified] : [id number] [de-identified] : [id number] [de-identified] : FOM/Necks

## 2025-07-30 NOTE — HISTORY OF PRESENT ILLNESS
[FreeTextEntry1] : Mr. Hawkins is a 42 year old male 20 pack year smoking history (quit 1 month ago) and former heavy drinker (sober 7 years) with Stage IVB [id number] SCC of the left anterior FOM s/p composite resection of FOM with marginal mandibulectomy and partial glossectomy and bilateral neck dissection (1-4 left, 1-3 right) and left RFFF. Pathology yielded SCC, well-moderately differentiated, 3.2cm, 1.6cm DOI, +intratumoral PNI (0.5mm nerve diameter), present at bone margin, +LVSI, 6/83 lymph nodes with largest measuring 4.7cm and +SUHA (left level I with SUHA).   7/15/;2025 He presents for on treatment visit. Completed 5/33 fractions He has c/o right sided jaw/mouth pain when laying down on right side. Currently taking oxycodone 5 m g and Tylenol 1 g at night which is effective. Will add Gabapentin 600 mg at HS Pain referral initiated  He reports mechanical problems with chewing, instructed to follow SLP  7/23/2025 He presents for on treatment visit. Completed 11/33 fractions  7/29/2025 He presents for on treatment visit. Completed 15/33 fractions Reports feeling nausea and regurgitation after meals, restarted Reglan. Ongoing taste changes, encouraged to eat for nutrition and try various food choices Right oral pain is resolved, no longer needing pain medication Reinforced baking soda oral rinses Continues weekly low dose Cisplatin